# Patient Record
Sex: FEMALE | Race: WHITE | NOT HISPANIC OR LATINO | Employment: OTHER
[De-identification: names, ages, dates, MRNs, and addresses within clinical notes are randomized per-mention and may not be internally consistent; named-entity substitution may affect disease eponyms.]

---

## 2017-08-12 ENCOUNTER — HEALTH MAINTENANCE LETTER (OUTPATIENT)
Age: 27
End: 2017-08-12

## 2022-05-15 ENCOUNTER — APPOINTMENT (OUTPATIENT)
Dept: CT IMAGING | Facility: CLINIC | Age: 32
End: 2022-05-15
Attending: EMERGENCY MEDICINE
Payer: COMMERCIAL

## 2022-05-15 ENCOUNTER — HOSPITAL ENCOUNTER (EMERGENCY)
Facility: CLINIC | Age: 32
Discharge: HOME | End: 2022-05-15
Attending: EMERGENCY MEDICINE | Admitting: EMERGENCY MEDICINE
Payer: COMMERCIAL

## 2022-05-15 VITALS
DIASTOLIC BLOOD PRESSURE: 74 MMHG | OXYGEN SATURATION: 100 % | HEART RATE: 90 BPM | SYSTOLIC BLOOD PRESSURE: 116 MMHG | TEMPERATURE: 98.4 F | RESPIRATION RATE: 18 BRPM

## 2022-05-15 DIAGNOSIS — J36 PERITONSILLAR ABSCESS: ICD-10-CM

## 2022-05-15 LAB
DEPRECATED S PYO AG THROAT QL EIA: NEGATIVE
GROUP A STREP BY PCR: NOT DETECTED
HCG SER QL IA.RAPID: NEGATIVE

## 2022-05-15 PROCEDURE — 70491 CT SOFT TISSUE NECK W/DYE: CPT

## 2022-05-15 PROCEDURE — 250N000011 HC RX IP 250 OP 636: Performed by: EMERGENCY MEDICINE

## 2022-05-15 PROCEDURE — 96365 THER/PROPH/DIAG IV INF INIT: CPT | Mod: 59

## 2022-05-15 PROCEDURE — 250N000009 HC RX 250

## 2022-05-15 PROCEDURE — 10060 I&D ABSCESS SIMPLE/SINGLE: CPT | Mod: 59

## 2022-05-15 PROCEDURE — 87651 STREP A DNA AMP PROBE: CPT | Performed by: EMERGENCY MEDICINE

## 2022-05-15 PROCEDURE — 84702 CHORIONIC GONADOTROPIN TEST: CPT

## 2022-05-15 PROCEDURE — 99285 EMERGENCY DEPT VISIT HI MDM: CPT | Mod: 25

## 2022-05-15 PROCEDURE — 250N000009 HC RX 250: Performed by: EMERGENCY MEDICINE

## 2022-05-15 PROCEDURE — 96375 TX/PRO/DX INJ NEW DRUG ADDON: CPT | Mod: 59

## 2022-05-15 RX ORDER — KETOROLAC TROMETHAMINE 15 MG/ML
15 INJECTION, SOLUTION INTRAMUSCULAR; INTRAVENOUS ONCE
Status: COMPLETED | OUTPATIENT
Start: 2022-05-15 | End: 2022-05-15

## 2022-05-15 RX ORDER — OXYCODONE HCL 5 MG/5 ML
5 SOLUTION, ORAL ORAL EVERY 6 HOURS PRN
Qty: 60 ML | Refills: 0 | Status: SHIPPED | OUTPATIENT
Start: 2022-05-15 | End: 2022-05-18

## 2022-05-15 RX ORDER — DEXAMETHASONE SODIUM PHOSPHATE 10 MG/ML
10 INJECTION, SOLUTION INTRAMUSCULAR; INTRAVENOUS ONCE
Status: COMPLETED | OUTPATIENT
Start: 2022-05-15 | End: 2022-05-15

## 2022-05-15 RX ORDER — LIDOCAINE HYDROCHLORIDE AND EPINEPHRINE 10; 10 MG/ML; UG/ML
INJECTION, SOLUTION INFILTRATION; PERINEURAL
Status: DISCONTINUED
Start: 2022-05-15 | End: 2022-05-15 | Stop reason: HOSPADM

## 2022-05-15 RX ORDER — CLINDAMYCIN PHOSPHATE 900 MG/50ML
900 INJECTION, SOLUTION INTRAVENOUS ONCE
Status: COMPLETED | OUTPATIENT
Start: 2022-05-15 | End: 2022-05-15

## 2022-05-15 RX ORDER — CLINDAMYCIN HCL 150 MG
450 CAPSULE ORAL 3 TIMES DAILY
Qty: 90 CAPSULE | Refills: 0 | Status: SHIPPED | OUTPATIENT
Start: 2022-05-15 | End: 2022-05-25

## 2022-05-15 RX ORDER — IOPAMIDOL 755 MG/ML
500 INJECTION, SOLUTION INTRAVASCULAR ONCE
Status: COMPLETED | OUTPATIENT
Start: 2022-05-15 | End: 2022-05-15

## 2022-05-15 RX ADMIN — SODIUM CHLORIDE 65 ML: 9 INJECTION, SOLUTION INTRAVENOUS at 15:21

## 2022-05-15 RX ADMIN — IOPAMIDOL 100 ML: 755 INJECTION, SOLUTION INTRAVENOUS at 15:21

## 2022-05-15 RX ADMIN — CLINDAMYCIN PHOSPHATE 900 MG: 900 INJECTION, SOLUTION INTRAVENOUS at 14:47

## 2022-05-15 RX ADMIN — DEXAMETHASONE SODIUM PHOSPHATE 10 MG: 10 INJECTION, SOLUTION INTRAMUSCULAR; INTRAVENOUS at 14:46

## 2022-05-15 RX ADMIN — KETOROLAC TROMETHAMINE 15 MG: 15 INJECTION, SOLUTION INTRAMUSCULAR; INTRAVENOUS at 14:44

## 2022-05-15 ASSESSMENT — ENCOUNTER SYMPTOMS
NAUSEA: 1
CHILLS: 1
SHORTNESS OF BREATH: 0
FEVER: 1
VOMITING: 0

## 2022-05-15 NOTE — ED TRIAGE NOTES
Pt presents with left sided tonsillar abscess. Pt seen at  and sent in for abscess. Denies difficulty breathing.      Triage Assessment     Row Name 05/15/22 1151       Triage Assessment (Adult)    Airway WDL WDL       Respiratory WDL    Respiratory WDL WDL       Skin Circulation/Temperature WDL    Skin Circulation/Temperature WDL WDL       Cardiac WDL    Cardiac WDL WDL       Peripheral/Neurovascular WDL    Peripheral Neurovascular WDL WDL       Cognitive/Neuro/Behavioral WDL    Cognitive/Neuro/Behavioral WDL WDL

## 2022-05-15 NOTE — ED PROVIDER NOTES
History     Chief Complaint:  Oral Swelling      HPI     Dianna Iniguez is a 31 year old female who presents with throat discomfort.  Patient had the onset of symptoms 3 days prior.  Symptoms began with a progressively worsening sore throat which is aggravated by swallowing.  She reports that she cannot swallow at this time secondary to the level of discomfort.  She has been spitting into an emesis bag during evaluation.  Pain does not radiate to any other location.  She has been nauseated without vomiting.  She has subjective fevers and chills but did not document a temperature.  [facility] to [facility] ED today out of concern for peritonsillar abscess.    Review of Systems   Constitutional: Positive for chills and fever.   Respiratory: Negative for shortness of breath.    Gastrointestinal: Positive for nausea. Negative for vomiting.   Skin: Negative for rash.   All other systems reviewed and are negative.    Allergies:  No Known Allergies      Medications:    clindamycin (CLEOCIN) 150 MG capsule  oxyCODONE (ROXICODONE) 5 MG/5ML solution  albuterol (PROVENTIL HFA: VENTOLIN HFA) 108 (90 BASE) MCG/ACT inhaler  citalopram (CELEXA) 20 MG tablet  levonorgestrel-ethinyl estradiol (AVIANE,ALESSE,LESSINA) 0.1-20 MG-MCG per tablet        Past Medical History:    Hyperlipidemia  Depression  Asthma  Anxiety    Past Medical History:   Diagnosis Date     Smoker        Family History:    Mother-asthma    Social History:  Presents to the ED with daughter    Physical Exam     Patient Vitals for the past 24 hrs:   BP Temp Temp src Pulse Resp SpO2   05/15/22 1710 116/74 -- -- 90 -- 100 %   05/15/22 1600 -- -- -- -- 18 96 %   05/15/22 1545 119/68 -- -- -- 18 99 %   05/15/22 1150 (!) 124/90 98.4  F (36.9  C) Oral 94 16 97 %       Physical Exam    HEENT:    Tympanic membranes are clear bilateral.      Oropharynx is moist.       Bilateral tonsillar erythema with exudate      Mild asymmetric edema on the left  tonsil      No deviation of the uvula.     No pooling of secretions, trismus or sublingual edema.  Eyes:    Conjunctiva normal, PERRL  Neck:    Supple, no meningismus.       No pain with manipulation of the hyoid.   CV:     Regular rate and rhythm     no murmurs, rubs or gallops.    PULM:    Clear to auscultation bilateral.       No respiratory distress.       No stridor.  ABD:    Soft, non-tender, non-distended.      No rebound or guarding.     No splenomegaly.  MSK:     No gross deformity to all four extremities.   LYMPH:   Bilateral anterior cervical lymphadenopathy.  NEURO:   Alert.  Normal muscular tone, no atrophy.  Skin:    Warm, dry and intact.    PSYCH:    Anxious, tearful.      Emergency Department Course     Imaging:  Soft tissue neck CT w contrast   Final Result   IMPRESSION:    1.  Acute bilateral palatine tonsillitis, left greater than right, with a peripherally enhancing fluid collection within or adjacent to the left palatine tonsil measuring 9 x 6 x 10 mm, consistent with peritonsillar/tonsillar abscess.   2.  Subtotal fluid opacification of the left sphenoid sinus. Recommend correlation for acute sinusitis.   3.  Multifocal cervical adenopathy, left greater than right, which is indeterminate, though likely reactive. Recommend clinical follow-up to resolution.        Report per radiology    Laboratory:  Labs Ordered and Resulted from Time of ED Arrival to Time of ED Departure   ISTAT HCG QUALITATIVE PREGNANCY POCT - Normal       Result Value    HCG Qualitative POCT Negative     STREPTOCOCCUS A RAPID SCREEN W REFELX TO PCR - Normal    Group A Strep antigen Negative     GROUP A STREPTOCOCCUS PCR THROAT SWAB       Procedures:      Procedure: Peritonsillar abscess aspiration   LOCATIONS:  Left     ANESTHESIA: Topical anesthetic with Cetacaine spray followed by 1 cc of 1% lidocaine with epinephrine     PROCEDURE:  An 18-gauge needle was advanced to the most cephalad portion of the tonsil without purulent  material returned upon aspiration.  There were additional punctures at the middle and inferior aspect of the tonsil again without purulent drainage.    Patient Status:        Patient tolerated the procedure well. There were no complications.              Emergency Department Course:             Reviewed:  I reviewed nursing notes, vitals and past medical history    Assessments:  1415 I obtained history and examined the patient as noted above.    I rechecked the patient and explained findings.       Interventions:  Medications   lidocaine 1% with EPINEPHrine 1:100,000 1 %-1:081080 injection (has no administration in time range)   benzocaine 20% (HURRICAINE/TOPEX) 20 % spray (has no administration in time range)   dexamethasone PF (DECADRON) injection 10 mg (10 mg Intravenous Given 5/15/22 1446)   clindamycin (CLEOCIN) infusion 900 mg (0 mg Intravenous Stopped 5/15/22 1515)   ketorolac (TORADOL) injection 15 mg (15 mg Intravenous Given 5/15/22 1444)   iopamidol (ISOVUE-370) solution 500 mL (100 mLs Intravenous Given 5/15/22 1521)   CT scan flush use (65 mLs As instructed Given 5/15/22 1521)       Disposition:  The patient was discharged to home.     Impression & Plan      Medical Decision Makin-year-old female presented to the ED with sore throat and [facility] out of concerns for peritonsillar abscess.  Her examination was not definitive for abscess thus underwent CT scan of her neck which revealed a 1 cm fluid collection in the left tonsil.  Patient was given IV dexamethasone and clindamycin. Patient underwent aspiration at 3 levels without purulent drainage.  Patient clinically appears well and has no obstruction.  She is safe for discharge home with oxycodone, clindamycin and follow-up with ENT.  Return to ED for any worsening symptoms.    Covid-19  Dianna Iniguez was evaluated during a global COVID-19 pandemic, which necessitated consideration that the patient might be at risk for  infection with the SARS-CoV-2 virus that causes COVID-19.   Applicable protocols for evaluation were followed during the patient's care.   COVID-19 was considered as part of the patient's evaluation.    Diagnosis:    ICD-10-CM    1. Peritonsillar abscess  J36        Discharge Medications:  Discharge Medication List as of 5/15/2022  5:05 PM      START taking these medications    Details   clindamycin (CLEOCIN) 150 MG capsule Take 3 capsules (450 mg) by mouth 3 times daily for 10 days, Disp-90 capsule, R-0, Local Print      oxyCODONE (ROXICODONE) 5 MG/5ML solution Take 5 mLs (5 mg) by mouth every 6 hours as needed for pain, Disp-60 mL, R-0, Local Print              Js Hairston MD  05/15/22 8895

## 2022-05-16 NOTE — RESULT ENCOUNTER NOTE
Group A Streptococcus PCR is NEGATIVE  No treatment or change in treatment [facility] ED lab result Strep Group A protocol.

## 2024-12-05 ENCOUNTER — NURSE TRIAGE (OUTPATIENT)
Dept: NURSING | Facility: CLINIC | Age: 34
End: 2024-12-05
Payer: COMMERCIAL

## 2024-12-06 NOTE — TELEPHONE ENCOUNTER
Nurse Triage SBAR    Is this a 2nd Level Triage? NO    Situation: Nose bleeding continually     Background: Had septoplasty done this morning and has stents    Assessment: Has been bleeding from her nose for two hours.  Patient taking pain medications for increased pain.  Denies severe dizziness.  She is alone with her five-year old.    Protocol Recommended Disposition:   Call 911, Go to ED Now    Recommendation: Patient verbalizes understanding of advice but says she has nobody to take her to ED and she can't drive. She agrees wo call 911.    Angelic Aleman RN  [facility]        Reason for Disposition   [1] Bleeding present > 30 minutes AND [2] using correct method of direct pressure    Additional Information   Negative: Fainted or too weak to stand following large blood loss   Negative: Sounds like a life-threatening emergency to the triager    Protocols used: Nosebleed-A-ELOINA